# Patient Record
Sex: FEMALE | URBAN - METROPOLITAN AREA
[De-identification: names, ages, dates, MRNs, and addresses within clinical notes are randomized per-mention and may not be internally consistent; named-entity substitution may affect disease eponyms.]

---
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
## 2025-04-06 NOTE — PROCEDURE: MEDICATION COUNSELING
Stop the ampicillin.  Start Keflex twice a day until gone.  You may continue Azo as needed for symptoms.  Take Tylenol or Motrin as needed for pain.  Increase your water intake.  Schedule recheck with your primary physician.  Go to the emergency room if worsening pain, vomiting, fever   Klisyri Pregnancy And Lactation Text: It is unknown if this medication can harm a developing fetus or if it is excreted in breast milk.